# Patient Record
Sex: FEMALE | Race: WHITE | NOT HISPANIC OR LATINO | Employment: OTHER | URBAN - METROPOLITAN AREA
[De-identification: names, ages, dates, MRNs, and addresses within clinical notes are randomized per-mention and may not be internally consistent; named-entity substitution may affect disease eponyms.]

---

## 2024-08-26 ENCOUNTER — APPOINTMENT (OUTPATIENT)
Dept: OBSTETRICS AND GYNECOLOGY | Facility: CLINIC | Age: 49
End: 2024-08-26
Payer: COMMERCIAL

## 2024-08-26 VITALS — SYSTOLIC BLOOD PRESSURE: 110 MMHG | WEIGHT: 140 LBS | BODY MASS INDEX: 23.3 KG/M2 | DIASTOLIC BLOOD PRESSURE: 62 MMHG

## 2024-08-26 DIAGNOSIS — Z01.419 ENCOUNTER FOR GYNECOLOGICAL EXAMINATION WITHOUT ABNORMAL FINDING: ICD-10-CM

## 2024-08-26 DIAGNOSIS — Z12.11 SCREEN FOR COLON CANCER: Primary | ICD-10-CM

## 2024-08-26 PROCEDURE — 99386 PREV VISIT NEW AGE 40-64: CPT | Performed by: OBSTETRICS & GYNECOLOGY

## 2024-08-26 PROCEDURE — 88142 CYTOPATH C/V THIN LAYER: CPT

## 2024-08-26 PROCEDURE — 87624 HPV HI-RISK TYP POOLED RSLT: CPT

## 2024-08-26 PROCEDURE — 1036F TOBACCO NON-USER: CPT | Performed by: OBSTETRICS & GYNECOLOGY

## 2024-08-26 NOTE — PROGRESS NOTES
Subjective   Kassie Archer is a 49 y.o. female  G0 here for a routine exam.  She is a new patient to this practice.  She reports possibly 1 abnormal Pap smear in the remote past with colposcopy.  Normal since.    She typically has monthly cycles.  She has never skipped any menses but is occasionally late for about 2 months.  She finds they are a bit lighter and shorter recently.   She has no cramps or heavy flow.  No dysuria or change in bowel habits.    She remembers having an HSG  and told that she has 1 blocked tube.  She is sexually active with her boyfriend, not using contraception.    There is family history of breast cancer in maternal grandmother.    Personal health questionnaire reviewed: yes.     Gynecologic History  Patient's last menstrual period was 2024 (approximate).  Contraception: none  Last Pap: unsure. Results were: normal  Last mammogram: none. Results were: normal    Obstetric History  OB History    Para Term  AB Living   0 0 0 0 0 0   SAB IAB Ectopic Multiple Live Births   0 0 0 0 0       Objective   Constitutional: Alert and in no acute distress. Well developed, well nourished.   Head and Face: Head and face: Normal.    Eyes: Normal external exam - nonicteric sclera, extraocular movements intact (EOMI) and no ptosis.   Neck: No neck asymmetry. Supple. Thyroid not enlarged and there were no palpable thyroid nodules.    Pulmonary: No respiratory distress.   Chest: Breasts: Normal appearance, no nipple discharge and no skin changes. Palpation of breasts and axillae: No palpable mass and no axillary lymphadenopathy.   Abdomen: Soft nontender; no abdominal mass palpated. No organomegaly. No hernias.   Genitourinary: External genitalia: Normal. No inguinal lymphadenopathy. Bartholin's Urethral and Skenes Glands: Normal. Urethra: Normal.  Bladder: Normal on palpation. Vagina: Normal. Cervix: Normal.  Tiny 2 mm endocervical polyp was noted.  Uterus: Normal.  Right  Adnexa/parametria: Normal.  Left Adnexa/parametria: Normal.  Inspection of Perianal Area: Normal.   Musculoskeletal: No joint swelling seen, normal movements of all extremities.   Skin: Normal skin color and pigmentation, normal skin turgor, and no rash.   Neurologic: Non-focal. Grossly intact.   Psychiatric: Alert and oriented x 3. Affect normal to patient baseline. Mood: Appropriate.  Physical Exam     Assessment/Plan   Healthy female exam.  This is a 49-year-old female  0 with a normal exam.  A Pap smear was sent.    Her baseline mammogram was ordered with tomosynthesis.    We discussed colon cancer screening options and a colonoscopy was ordered.    We discussed using contraception until she has no menses for 1 full year.    I will see her routinely in 1 year.  Mammogram ordered.

## 2024-09-09 LAB
CYTOLOGY CMNT CVX/VAG CYTO-IMP: NORMAL
HPV HR 12 DNA GENITAL QL NAA+PROBE: NEGATIVE
HPV HR GENOTYPES PNL CVX NAA+PROBE: NEGATIVE
HPV16 DNA SPEC QL NAA+PROBE: NEGATIVE
HPV18 DNA SPEC QL NAA+PROBE: NEGATIVE
LAB AP HPV GENOTYPE QUESTION: YES
LAB AP HPV HR: NORMAL
LAB AP PREVIOUS ABNORMAL HISTORY: NORMAL
LABORATORY COMMENT REPORT: NORMAL
LABORATORY COMMENT REPORT: NORMAL
LMP START DATE: NORMAL
PATH REPORT.TOTAL CANCER: NORMAL

## 2024-09-11 ENCOUNTER — HOSPITAL ENCOUNTER (OUTPATIENT)
Dept: RADIOLOGY | Facility: CLINIC | Age: 49
Discharge: HOME | End: 2024-09-11
Payer: COMMERCIAL

## 2024-09-11 VITALS — WEIGHT: 138 LBS | HEIGHT: 65 IN | BODY MASS INDEX: 22.99 KG/M2

## 2024-09-11 DIAGNOSIS — Z01.419 ENCOUNTER FOR GYNECOLOGICAL EXAMINATION WITHOUT ABNORMAL FINDING: ICD-10-CM

## 2024-09-11 PROCEDURE — 77067 SCR MAMMO BI INCL CAD: CPT

## 2024-10-08 PROBLEM — H52.13 MYOPIA, BILATERAL: Status: ACTIVE | Noted: 2024-04-22

## 2024-10-08 PROBLEM — M54.50 LOW BACK PAIN: Status: ACTIVE | Noted: 2024-10-08

## 2024-10-08 PROBLEM — L65.9 HAIR LOSS: Status: ACTIVE | Noted: 2024-10-08

## 2024-10-08 PROBLEM — M62.830 MUSCLE SPASM OF BACK: Status: ACTIVE | Noted: 2024-10-08

## 2024-10-08 PROBLEM — R30.0 DYSURIA: Status: ACTIVE | Noted: 2024-10-08

## 2024-10-08 PROBLEM — S39.012A LUMBAR STRAIN: Status: ACTIVE | Noted: 2024-10-08

## 2024-10-08 PROBLEM — M70.20 OLECRANON BURSITIS: Status: ACTIVE | Noted: 2024-10-08

## 2024-10-08 PROBLEM — N70.11 HYDROSALPINX: Status: ACTIVE | Noted: 2024-10-08

## 2024-10-08 PROBLEM — R63.5 WEIGHT GAIN, ABNORMAL: Status: ACTIVE | Noted: 2024-10-08

## 2024-10-08 PROBLEM — M19.021 OSTEOARTHRITIS OF RIGHT ELBOW: Status: ACTIVE | Noted: 2024-10-08

## 2024-10-09 ENCOUNTER — APPOINTMENT (OUTPATIENT)
Dept: PRIMARY CARE | Facility: CLINIC | Age: 49
End: 2024-10-09
Payer: COMMERCIAL

## 2024-10-09 VITALS
TEMPERATURE: 97.6 F | SYSTOLIC BLOOD PRESSURE: 110 MMHG | HEART RATE: 61 BPM | BODY MASS INDEX: 22.89 KG/M2 | OXYGEN SATURATION: 98 % | DIASTOLIC BLOOD PRESSURE: 82 MMHG | WEIGHT: 137.4 LBS | HEIGHT: 65 IN

## 2024-10-09 DIAGNOSIS — Z00.00 HEALTH MAINTENANCE EXAMINATION: Primary | ICD-10-CM

## 2024-10-09 DIAGNOSIS — Z12.11 SCREEN FOR COLON CANCER: ICD-10-CM

## 2024-10-09 PROBLEM — R63.5 WEIGHT GAIN, ABNORMAL: Status: RESOLVED | Noted: 2024-10-08 | Resolved: 2024-10-09

## 2024-10-09 PROBLEM — M70.20 OLECRANON BURSITIS: Status: RESOLVED | Noted: 2024-10-08 | Resolved: 2024-10-09

## 2024-10-09 PROBLEM — L65.9 HAIR LOSS: Status: RESOLVED | Noted: 2024-10-08 | Resolved: 2024-10-09

## 2024-10-09 PROBLEM — M19.021 OSTEOARTHRITIS OF RIGHT ELBOW: Status: RESOLVED | Noted: 2024-10-08 | Resolved: 2024-10-09

## 2024-10-09 PROBLEM — S39.012A LUMBAR STRAIN: Status: RESOLVED | Noted: 2024-10-08 | Resolved: 2024-10-09

## 2024-10-09 PROBLEM — M54.50 LOW BACK PAIN: Status: RESOLVED | Noted: 2024-10-08 | Resolved: 2024-10-09

## 2024-10-09 PROBLEM — M62.830 MUSCLE SPASM OF BACK: Status: RESOLVED | Noted: 2024-10-08 | Resolved: 2024-10-09

## 2024-10-09 PROBLEM — R30.0 DYSURIA: Status: RESOLVED | Noted: 2024-10-08 | Resolved: 2024-10-09

## 2024-10-09 LAB
ALBUMIN SERPL BCP-MCNC: 4.5 G/DL (ref 3.4–5)
ALP SERPL-CCNC: 54 U/L (ref 33–110)
ALT SERPL W P-5'-P-CCNC: 11 U/L (ref 7–45)
ANION GAP SERPL CALC-SCNC: 12 MMOL/L (ref 10–20)
AST SERPL W P-5'-P-CCNC: 15 U/L (ref 9–39)
BILIRUB SERPL-MCNC: 0.6 MG/DL (ref 0–1.2)
BUN SERPL-MCNC: 10 MG/DL (ref 6–23)
CALCIUM SERPL-MCNC: 10.1 MG/DL (ref 8.6–10.6)
CHLORIDE SERPL-SCNC: 103 MMOL/L (ref 98–107)
CHOLEST SERPL-MCNC: 164 MG/DL (ref 0–199)
CHOLESTEROL/HDL RATIO: 2.5
CO2 SERPL-SCNC: 27 MMOL/L (ref 21–32)
CREAT SERPL-MCNC: 0.65 MG/DL (ref 0.5–1.05)
EGFRCR SERPLBLD CKD-EPI 2021: >90 ML/MIN/1.73M*2
ERYTHROCYTE [DISTWIDTH] IN BLOOD BY AUTOMATED COUNT: 13.1 % (ref 11.5–14.5)
GLUCOSE SERPL-MCNC: 93 MG/DL (ref 74–99)
HCT VFR BLD AUTO: 41.4 % (ref 36–46)
HDLC SERPL-MCNC: 66.5 MG/DL
HGB BLD-MCNC: 13.5 G/DL (ref 12–16)
LDLC SERPL CALC-MCNC: 80 MG/DL
MCH RBC QN AUTO: 29.5 PG (ref 26–34)
MCHC RBC AUTO-ENTMCNC: 32.6 G/DL (ref 32–36)
MCV RBC AUTO: 90 FL (ref 80–100)
NON HDL CHOLESTEROL: 98 MG/DL (ref 0–149)
NRBC BLD-RTO: 0 /100 WBCS (ref 0–0)
PLATELET # BLD AUTO: 319 X10*3/UL (ref 150–450)
POTASSIUM SERPL-SCNC: 4 MMOL/L (ref 3.5–5.3)
PROT SERPL-MCNC: 7.2 G/DL (ref 6.4–8.2)
RBC # BLD AUTO: 4.58 X10*6/UL (ref 4–5.2)
SODIUM SERPL-SCNC: 138 MMOL/L (ref 136–145)
TRIGL SERPL-MCNC: 90 MG/DL (ref 0–149)
TSH SERPL-ACNC: 1.88 MIU/L (ref 0.44–3.98)
VLDL: 18 MG/DL (ref 0–40)
WBC # BLD AUTO: 4.8 X10*3/UL (ref 4.4–11.3)

## 2024-10-09 PROCEDURE — 80053 COMPREHEN METABOLIC PANEL: CPT

## 2024-10-09 PROCEDURE — 1036F TOBACCO NON-USER: CPT | Performed by: FAMILY MEDICINE

## 2024-10-09 PROCEDURE — 3008F BODY MASS INDEX DOCD: CPT | Performed by: FAMILY MEDICINE

## 2024-10-09 PROCEDURE — 99396 PREV VISIT EST AGE 40-64: CPT | Performed by: FAMILY MEDICINE

## 2024-10-09 PROCEDURE — 85027 COMPLETE CBC AUTOMATED: CPT

## 2024-10-09 PROCEDURE — 80061 LIPID PANEL: CPT

## 2024-10-09 PROCEDURE — 84443 ASSAY THYROID STIM HORMONE: CPT

## 2024-10-09 ASSESSMENT — PATIENT HEALTH QUESTIONNAIRE - PHQ9
2. FEELING DOWN, DEPRESSED OR HOPELESS: NOT AT ALL
1. LITTLE INTEREST OR PLEASURE IN DOING THINGS: NOT AT ALL
SUM OF ALL RESPONSES TO PHQ9 QUESTIONS 1 AND 2: 0

## 2024-10-09 ASSESSMENT — ENCOUNTER SYMPTOMS
DEPRESSION: 0
LOSS OF SENSATION IN FEET: 0
OCCASIONAL FEELINGS OF UNSTEADINESS: 0

## 2024-10-09 NOTE — ASSESSMENT & PLAN NOTE
Recommended screening guidelines addressed and orders placed as indicated by age and chronic conditions  Screening labs ordered, will call with results  Colonoscopy ordered  Mammogram and PAP with GYN  Continue to work on healthy lifestyle including well balanced diet, regular activity, limit alcohol, no tobacco products and safe sexual practices  Follow up annually

## 2024-10-09 NOTE — PROGRESS NOTES
"Reason for Visit: Annual Physical Exam    HPI:  Presents for wellness visit  Saw Dr. Alberto for GYN, had mammogram and PAP smear    Active Problem List  Patient Active Problem List   Diagnosis    Choroidal nevus of left eye    Hydrosalpinx    Myopia, bilateral    Health maintenance examination       Comprehensive Medical/Surgical/Social/Family History  Past Medical History:   Diagnosis Date    Other specified health status     No pertinent past medical history     History reviewed. No pertinent surgical history.  Social History     Tobacco Use    Smoking status: Never    Smokeless tobacco: Never   Substance Use Topics    Alcohol use: Yes     Comment: social    Drug use: Never     Family History   Problem Relation Name Age of Onset    Breast cancer Maternal Grandmother           Allergies and Medications  Patient has no known allergies.  No current outpatient medications on file prior to visit.     No current facility-administered medications on file prior to visit.         ROS otherwise negative aside from what was mentioned above in HPI.    Vitals  /82 (BP Location: Right arm, Patient Position: Sitting, BP Cuff Size: Adult)   Pulse 61   Temp 36.4 °C (97.6 °F) (Oral)   Ht 1.651 m (5' 5\")   Wt 62.3 kg (137 lb 6.4 oz)   SpO2 98%   BMI 22.86 kg/m²   Body mass index is 22.86 kg/m².  Physical Exam  Gen: Alert, NAD  HEENT:  PERRL, EOMI, conjunctiva and sclera normal in appearance. External auditory canals/TMs normal; Oral cavity and posterior pharynx without lesions/exudate  Neck:  Supple with FROM; No masses/nodes palpable; Thyroid nontender and without nodules; No LAUREEN  Respiratory:  Lungs CTAB  Cardiovascular:  Heart RRR. No M/R/G. Peripheral pulses equal bilaterally  Abdomen:  Soft, nontender, No R/G/R; No HSM or masses palpated  Extremities:  FROM all extremities; Muscle strength grossly normal with good tone  Neuro:  CN II-XII intact; Gross motor and sensory intact  Skin:  No suspicious lesions " present    Assessment and Plan:  Problem List Items Addressed This Visit       Health maintenance examination - Primary    Current Assessment & Plan     Recommended screening guidelines addressed and orders placed as indicated by age and chronic conditions  Screening labs ordered, will call with results  Colonoscopy ordered  Mammogram and PAP with GYN  Continue to work on healthy lifestyle including well balanced diet, regular activity, limit alcohol, no tobacco products and safe sexual practices  Follow up annually           Relevant Orders    Comprehensive Metabolic Panel    CBC    Lipid Panel    TSH with reflex to Free T4 if abnormal     Other Visit Diagnoses       Screen for colon cancer        Relevant Orders    Colonoscopy Screening; Average Risk Patient              Rebeca Gaffney MD

## 2025-04-27 NOTE — H&P
"History Of Present Illness  Kassie Archer is a 49 y.o. female presenting for a screening colonoscopy.   Denies family hx of CRC.     Past Medical History  Medical History[1]  Surgical History  Surgical History[2]  Social History  She reports that she has never smoked. She has never used smokeless tobacco. She reports current alcohol use. She reports that she does not use drugs.    Family History  Family History[3]     Allergies  Allergies[4]  Review of Systems  Constitutional/ General: No fever  Eyes: no yellow discoloration  ENT: normal   Cardiovascular: no chest pain  Respiratory: no shortness of breath  Gastrointestinal: denies abdominal pain   Neurologic: no weakness of extremities  Psychiatric: no mood fluctuations  Genitourinary: no hematuria    All other systems have been reviewed and are negative except as noted in the HPI and above.    Pre-sedation Evaluation:  ASA Classification - ASA 1 - Normal health patient  Mallampati Score - II (hard and soft palate, upper portion of tonsils and uvula visible)    Physical Exam  Constitutional: awake, alert, in no acute distress  Eyes: EOMI    Respiratory: Bilateral air entry equal   Cardiovascular: regular rate and rhythm, no lower extremity edema  Abdomen: soft, non tender, non distended    Neurologic: gross motor strength intact   Psychiatric: alert, appropriate mood and affect, oriented to time/place/person    Last Recorded Vitals  Blood pressure 142/80, pulse 71, temperature 36.4 °C (97.5 °F), temperature source Temporal, resp. rate 14, height 1.651 m (5' 5\"), weight 62 kg (136 lb 11 oz), SpO2 98%, not currently breastfeeding.     Assessment/Plan   Plan for colonoscopy today. Will review procedure details, potential risks and obtain informed consent.        PTA/Current Medications:  Prescriptions Prior to Admission[5]  Current Medications[6]  Helio Benites MD         [1]   Past Medical History:  Diagnosis Date    Other specified health status     No " pertinent past medical history   [2] History reviewed. No pertinent surgical history.  [3]   Family History  Problem Relation Name Age of Onset    Breast cancer Maternal Grandmother     [4] No Known Allergies  [5] (Not in a hospital admission)   [6]   No current outpatient medications on file.     No current facility-administered medications for this encounter.

## 2025-04-28 ENCOUNTER — HOSPITAL ENCOUNTER (OUTPATIENT)
Dept: GASTROENTEROLOGY | Facility: HOSPITAL | Age: 50
Discharge: HOME | End: 2025-04-28
Payer: COMMERCIAL

## 2025-04-28 VITALS
OXYGEN SATURATION: 100 % | SYSTOLIC BLOOD PRESSURE: 113 MMHG | HEIGHT: 65 IN | RESPIRATION RATE: 12 BRPM | TEMPERATURE: 97.2 F | DIASTOLIC BLOOD PRESSURE: 73 MMHG | HEART RATE: 51 BPM | BODY MASS INDEX: 22.77 KG/M2 | WEIGHT: 136.69 LBS

## 2025-04-28 DIAGNOSIS — Z12.11 SCREEN FOR COLON CANCER: ICD-10-CM

## 2025-04-28 LAB — PREGNANCY TEST URINE, POC: NEGATIVE

## 2025-04-28 PROCEDURE — 3700000012 HC SEDATION LEVEL 5+ TIME - INITIAL 15 MINUTES 5/> YEARS

## 2025-04-28 PROCEDURE — 99153 MOD SED SAME PHYS/QHP EA: CPT | Performed by: STUDENT IN AN ORGANIZED HEALTH CARE EDUCATION/TRAINING PROGRAM

## 2025-04-28 PROCEDURE — 2500000004 HC RX 250 GENERAL PHARMACY W/ HCPCS (ALT 636 FOR OP/ED): Mod: JZ | Performed by: STUDENT IN AN ORGANIZED HEALTH CARE EDUCATION/TRAINING PROGRAM

## 2025-04-28 PROCEDURE — 3700000013 HC SEDATION LEVEL 5+ TIME - EACH ADDITIONAL 15 MINUTES

## 2025-04-28 PROCEDURE — 45385 COLONOSCOPY W/LESION REMOVAL: CPT | Performed by: STUDENT IN AN ORGANIZED HEALTH CARE EDUCATION/TRAINING PROGRAM

## 2025-04-28 PROCEDURE — 7100000009 HC PHASE TWO TIME - INITIAL BASE CHARGE

## 2025-04-28 PROCEDURE — 81025 URINE PREGNANCY TEST: CPT | Performed by: STUDENT IN AN ORGANIZED HEALTH CARE EDUCATION/TRAINING PROGRAM

## 2025-04-28 PROCEDURE — 99152 MOD SED SAME PHYS/QHP 5/>YRS: CPT | Performed by: STUDENT IN AN ORGANIZED HEALTH CARE EDUCATION/TRAINING PROGRAM

## 2025-04-28 PROCEDURE — 2500000005 HC RX 250 GENERAL PHARMACY W/O HCPCS: Performed by: STUDENT IN AN ORGANIZED HEALTH CARE EDUCATION/TRAINING PROGRAM

## 2025-04-28 PROCEDURE — 7100000010 HC PHASE TWO TIME - EACH INCREMENTAL 1 MINUTE

## 2025-04-28 RX ORDER — FENTANYL CITRATE 50 UG/ML
INJECTION, SOLUTION INTRAMUSCULAR; INTRAVENOUS AS NEEDED
Status: COMPLETED | OUTPATIENT
Start: 2025-04-28 | End: 2025-04-28

## 2025-04-28 RX ORDER — MIDAZOLAM HYDROCHLORIDE 1 MG/ML
INJECTION INTRAMUSCULAR; INTRAVENOUS AS NEEDED
Status: COMPLETED | OUTPATIENT
Start: 2025-04-28 | End: 2025-04-28

## 2025-04-28 RX ADMIN — MIDAZOLAM HYDROCHLORIDE 2 MG: 1 INJECTION, SOLUTION INTRAMUSCULAR; INTRAVENOUS at 11:41

## 2025-04-28 RX ADMIN — MIDAZOLAM HYDROCHLORIDE 1 MG: 1 INJECTION, SOLUTION INTRAMUSCULAR; INTRAVENOUS at 11:43

## 2025-04-28 RX ADMIN — FENTANYL CITRATE 25 MCG: 50 INJECTION, SOLUTION INTRAMUSCULAR; INTRAVENOUS at 11:50

## 2025-04-28 RX ADMIN — MIDAZOLAM HYDROCHLORIDE 1 MG: 1 INJECTION, SOLUTION INTRAMUSCULAR; INTRAVENOUS at 11:50

## 2025-04-28 RX ADMIN — FENTANYL CITRATE 25 MCG: 50 INJECTION, SOLUTION INTRAMUSCULAR; INTRAVENOUS at 11:43

## 2025-04-28 RX ADMIN — Medication 2 L/MIN: at 11:39

## 2025-04-28 RX ADMIN — FENTANYL CITRATE 50 MCG: 50 INJECTION, SOLUTION INTRAMUSCULAR; INTRAVENOUS at 11:41

## 2025-04-28 ASSESSMENT — PAIN SCALES - GENERAL
PAINLEVEL_OUTOF10: 0 - NO PAIN
PAINLEVEL_OUTOF10: 2

## 2025-04-28 ASSESSMENT — PAIN - FUNCTIONAL ASSESSMENT
PAIN_FUNCTIONAL_ASSESSMENT: 0-10

## 2025-04-28 ASSESSMENT — COLUMBIA-SUICIDE SEVERITY RATING SCALE - C-SSRS
6. HAVE YOU EVER DONE ANYTHING, STARTED TO DO ANYTHING, OR PREPARED TO DO ANYTHING TO END YOUR LIFE?: NO
2. HAVE YOU ACTUALLY HAD ANY THOUGHTS OF KILLING YOURSELF?: NO
1. IN THE PAST MONTH, HAVE YOU WISHED YOU WERE DEAD OR WISHED YOU COULD GO TO SLEEP AND NOT WAKE UP?: NO

## 2025-04-29 ASSESSMENT — PAIN SCALES - GENERAL: PAINLEVEL_OUTOF10: 0 - NO PAIN

## 2025-05-05 LAB
LABORATORY COMMENT REPORT: NORMAL
PATH REPORT.FINAL DX SPEC: NORMAL
PATH REPORT.GROSS SPEC: NORMAL
PATH REPORT.RELEVANT HX SPEC: NORMAL
PATH REPORT.TOTAL CANCER: NORMAL

## 2025-08-08 ENCOUNTER — APPOINTMENT (OUTPATIENT)
Dept: PRIMARY CARE | Facility: CLINIC | Age: 50
End: 2025-08-08
Payer: COMMERCIAL

## 2025-09-10 ENCOUNTER — APPOINTMENT (OUTPATIENT)
Dept: PRIMARY CARE | Facility: CLINIC | Age: 50
End: 2025-09-10
Payer: COMMERCIAL